# Patient Record
Sex: MALE | Race: WHITE | HISPANIC OR LATINO | Employment: OTHER | URBAN - METROPOLITAN AREA
[De-identification: names, ages, dates, MRNs, and addresses within clinical notes are randomized per-mention and may not be internally consistent; named-entity substitution may affect disease eponyms.]

---

## 2019-04-16 ENCOUNTER — HOSPITAL ENCOUNTER (EMERGENCY)
Facility: HOSPITAL | Age: 29
Discharge: HOME | End: 2019-04-17
Attending: EMERGENCY MEDICINE

## 2019-04-16 DIAGNOSIS — K21.9 GERD (GASTROESOPHAGEAL REFLUX DISEASE): Primary | ICD-10-CM

## 2019-04-16 LAB — DEPRECATED S PYO AG THROAT QL EIA: NEGATIVE

## 2019-04-16 PROCEDURE — 99283 EMERGENCY DEPT VISIT LOW MDM: CPT | Mod: 25

## 2019-04-16 PROCEDURE — 25000003 PHARM REV CODE 250: Performed by: EMERGENCY MEDICINE

## 2019-04-16 PROCEDURE — 87081 CULTURE SCREEN ONLY: CPT

## 2019-04-16 PROCEDURE — 87880 STREP A ASSAY W/OPTIC: CPT

## 2019-04-16 RX ADMIN — LIDOCAINE HYDROCHLORIDE 50 ML: 20 SOLUTION ORAL; TOPICAL at 11:04

## 2019-04-17 VITALS
WEIGHT: 139.44 LBS | HEIGHT: 62 IN | OXYGEN SATURATION: 100 % | SYSTOLIC BLOOD PRESSURE: 157 MMHG | DIASTOLIC BLOOD PRESSURE: 88 MMHG | TEMPERATURE: 99 F | RESPIRATION RATE: 18 BRPM | BODY MASS INDEX: 25.66 KG/M2 | HEART RATE: 87 BPM

## 2019-04-17 RX ORDER — SUCRALFATE 1 G/1
1 TABLET ORAL 4 TIMES DAILY
Qty: 120 TABLET | Refills: 0 | Status: SHIPPED | OUTPATIENT
Start: 2019-04-17 | End: 2019-05-17

## 2019-04-17 RX ORDER — ESOMEPRAZOLE MAGNESIUM 40 MG/1
40 CAPSULE, DELAYED RELEASE ORAL DAILY
Qty: 30 CAPSULE | Refills: 11 | Status: SHIPPED | OUTPATIENT
Start: 2019-04-17 | End: 2020-04-16

## 2019-04-17 NOTE — ED PROVIDER NOTES
SCRIBE #1 NOTE: I, Valentina Emmanuel, am scribing for, and in the presence of, Roverto Rowland Jr., MD. I have scribed the entire note.      History      Chief Complaint   Patient presents with    Sore Throat       Review of patient's allergies indicates:  No Known Allergies     HPI   HPI    4/16/2019, 10:43 PM   History obtained from the patient      History of Present Illness: Himanshu Nicholas is a 29 y.o. male patient who presents to the Emergency Department for sore throat which onset gradually PTA. Sxs are constant and mild in severity. No modifying factors. Pt is also c/o burning epigastric abd pain consistent with heart burn. Associated sxs include subjective fever, non-productive cough, diarrhea. Pt denies any CP, n/v, congestion, and all other sxs. No further complaints or concerns.       Arrival mode: Personal vehicle      PCP: No primary care provider on file.       Past Medical History:  Past medical history reviewed not relevant    Past Surgical History:  Past surgical history reviewed not relevant    Family History:  Family history reviewed not relevant    Social History:    Social History Main Topics    Smoking status: Unknown if ever smoked    Smokeless tobacco: Unknown if ever used    Alcohol Use: Unknown drinking history    Drug Use: Unknown if ever used    Sexual Activity: Unknown     ROS   Review of Systems   Constitutional: Positive for fever (subjective).   HENT: Positive for sore throat. Negative for congestion.    Respiratory: Positive for cough. Negative for shortness of breath.    Cardiovascular: Negative for chest pain.   Gastrointestinal: Positive for abdominal pain (epigastric) and diarrhea. Negative for nausea and vomiting.   Genitourinary: Negative for dysuria.   Musculoskeletal: Negative for back pain.   Skin: Negative for rash.   Neurological: Negative for weakness.   Hematological: Does not bruise/bleed easily.   All other systems reviewed and are negative.    Physical  "Exam      Initial Vitals [04/16/19 2208]   BP Pulse Resp Temp SpO2   (!) 161/81 74 20 99.2 °F (37.3 °C) 97 %      MAP       --          Physical Exam  Nursing Notes and Vital Signs Reviewed.  Constitutional: Patient is in no acute distress. Well-developed and well-nourished.  Head: Atraumatic. Normocephalic.  Eyes: PERRL. EOM intact. Conjunctivae are not pale. No scleral icterus.  Injected conjunctiva.  Atopic faces.  Inflamed turbinates.  No P is clear.  There is no pus.  ENT: Mucous membranes are moist. Oropharynx is clear and symmetric.    Neck: Supple. Full ROM. No lymphadenopathy.  Cardiovascular: Regular rate. Regular rhythm. No murmurs, rubs, or gallops. Distal pulses are 2+ and symmetric.  Pulmonary/Chest: No respiratory distress. Clear to auscultation bilaterally. No wheezing or rales.  Abdominal: Soft and non-distended.  mild epigastric tenderness without guarding or rebound.  Musculoskeletal: Moves all extremities. No obvious deformities.   Skin: Warm and dry.  Neurological:  Alert, awake, and appropriate.  Normal speech.  No acute focal neurological deficits are appreciated.  Psychiatric: Normal affect. Good eye contact. Appropriate in content.    ED Course    Procedures  ED Vital Signs:  Vitals:    04/16/19 2208   BP: (!) 161/81   Pulse: 74   Resp: 20   Temp: 99.2 °F (37.3 °C)   TempSrc: Oral   SpO2: 97%   Weight: 63.3 kg (139 lb 7.1 oz)   Height: 5' 2" (1.575 m)       Abnormal Lab Results:  Labs Reviewed   THROAT SCREEN, RAPID   CULTURE, STREP A,  THROAT        All Lab Results:  Results for orders placed or performed during the hospital encounter of 04/16/19   Rapid strep screen   Result Value Ref Range    Rapid Strep A Screen Negative Negative     Imaging Results          X-Ray Chest PA And Lateral (In process)               12:38 AM: Per ED physician, pt's CXR results show: NAF         The Emergency Provider reviewed the vital signs and test results, which are outlined above.    ED Discussion "     11:32 PM: Re-evaluated pt. Pt reports he was seen in an ER in [place] a couple of months ago for epigastric pain that radiates up into his chest. He reports associated nausea. Pt states he was prescribed a medication but had no relief. He also reports a funny taste in his mouth and SOB secondary to the pain. He reports the pain is worse when he eats.     12:45 AM: Reassessed pt at this time.  Pt states his condition has improved at this time. Discussed with pt all pertinent ED information and results. Discussed pt dx and plan of tx. Gave pt all f/u and return to the ED instructions. All questions and concerns were addressed at this time. Pt expresses understanding of information and instructions, and is comfortable with plan to discharge. Pt is stable for discharge.    12:49 AM  Patient is stable nontoxic.  Patient complain of epigastric pain radiating to his chest with water brash.  He had been treated previously in [place]  for similar symptoms but is out of medications.  He has water brash in some sore throat as well. Clinically the patient has some epigastric tenderness in this appears to be gastroesophageal reflux.  I will treat him as such for close follow-up.  I have discussed all findings with the patient and he verbalized understanding.  We have used the  service here for this procedure.(EVANGELINA)    ED Medication(s):  Medications   GI cocktail (mylanta 30 mL, lidocaine 2 % viscous 10 mL, dicyclomine 10 mL) 50 mL (50 mLs Oral Given 4/16/19 7791)     New Prescriptions    ESOMEPRAZOLE (NEXIUM) 40 MG CAPSULE    Take 1 capsule (40 mg total) by mouth once daily.    SUCRALFATE (CARAFATE) 1 GRAM TABLET    Take 1 tablet (1 g total) by mouth 4 (four) times daily.     Follow-up Information     [facility] [place] [facility] In 1 day.    Specialty:  Urgent Care  Contact information:  [address] [place] [address] [phone]                     Medical Decision Making    Medical Decision  Making:   Clinical Tests:   Lab Tests: Ordered and Reviewed  Radiological Study: Ordered and Reviewed           Scribe Attestation:   Scribe #1: I performed the above scribed service and the documentation accurately describes the services I performed. I attest to the accuracy of the note.    Attending:   Physician Attestation Statement for Scribe #1: I, Roverto Rowland Jr., MD, personally performed the services described in this documentation, as scribed by Valentina Benitez, in my presence, and it is both accurate and complete.          Clinical Impression       ICD-10-CM ICD-9-CM   1. GERD (gastroesophageal reflux disease) K21.9 530.81       Disposition:   Disposition: Discharged  Condition: Stable         Roverto Rowland Jr., MD  04/17/19 0049

## 2019-04-17 NOTE — ED NOTES
"Pt reports being diagnosed with stomach infection where he has been having a fever and heartburn x3 weeks. Reports he was prescribed medicine but can not remember the name at this time. States "sometimes the burn gets so bad that I cant breathe anymore"  "

## 2019-04-19 ENCOUNTER — HOSPITAL ENCOUNTER (EMERGENCY)
Facility: HOSPITAL | Age: 29
Discharge: HOME | End: 2019-04-19
Attending: EMERGENCY MEDICINE

## 2019-04-19 VITALS
RESPIRATION RATE: 18 BRPM | HEART RATE: 69 BPM | BODY MASS INDEX: 23.49 KG/M2 | DIASTOLIC BLOOD PRESSURE: 75 MMHG | OXYGEN SATURATION: 98 % | HEIGHT: 64 IN | WEIGHT: 137.56 LBS | SYSTOLIC BLOOD PRESSURE: 145 MMHG | TEMPERATURE: 99 F

## 2019-04-19 DIAGNOSIS — Z87.898 HISTORY OF FEVER: ICD-10-CM

## 2019-04-19 DIAGNOSIS — B96.89 ACUTE BACTERIAL BRONCHITIS: Primary | ICD-10-CM

## 2019-04-19 DIAGNOSIS — J20.8 ACUTE BACTERIAL BRONCHITIS: Primary | ICD-10-CM

## 2019-04-19 DIAGNOSIS — R05.9 COUGH: ICD-10-CM

## 2019-04-19 LAB — BACTERIA THROAT CULT: NORMAL

## 2019-04-19 PROCEDURE — 99283 EMERGENCY DEPT VISIT LOW MDM: CPT

## 2019-04-19 RX ORDER — AMOXICILLIN AND CLAVULANATE POTASSIUM 875; 125 MG/1; MG/1
1 TABLET, FILM COATED ORAL 2 TIMES DAILY
Qty: 14 TABLET | Refills: 0 | Status: SHIPPED | OUTPATIENT
Start: 2019-04-19

## 2019-04-20 NOTE — ED PROVIDER NOTES
SCRIBE #1 NOTE: I, Hannah Whitehead, am scribing for, and in the presence of, Howard Alvarado NP. I have scribed the entire note.      History      Chief Complaint   Patient presents with    Fever     fever, HA, congestion, sore throat, pain on inspiration       Review of patient's allergies indicates:  No Known Allergies     HPI   HPI    4/19/2019, 7:15 PM   History obtained from the patient      History of Present Illness: Himanshu Nicholas is a 29 y.o. male patient who presents to the Emergency Department for fever which onset gradually 4 days ago. Symptoms are constant and moderate in severity. Pt was seen in ED on 4/16/19 for sore throat, epigastric abd pain, fever, and cough. Pt had negative chest x-ray and discharged with Nexium and Carafate. Pt reports no relief of sxs. No mitigating or exacerbating factors reported. Patient denies any rash, nasal congestion, rhinorrhea, ear pain, ear discharge, CP, SOB, HA, and all other sxs at this time. No further complaints or concerns at this time.       Arrival mode: Personal vehicle     PCP: Primary Doctor No     Past Medical History:  Past medical history reviewed not relevant      Past Surgical History:  Past surgical history reviewed not relevant      Family History:  Family history reviewed not relevant      Social History:  Social History    Social History Main Topics    Social History Main Topics    Smoking status: Unknown if ever smoked    Smokeless tobacco: Unknown if ever used    Alcohol Use: Unknown drinking history    Drug Use: Unknown if ever used    Sexual Activity: Unknown         ROS   Review of Systems   Constitutional: Positive for chills and fever.   HENT: Positive for sore throat. Negative for congestion, ear discharge, ear pain and rhinorrhea.    Respiratory: Positive for cough. Negative for shortness of breath.    Cardiovascular: Negative for chest pain.   Gastrointestinal: Positive for abdominal pain. Negative for diarrhea, nausea and  "vomiting.   Genitourinary: Negative for dysuria.   Musculoskeletal: Negative for back pain and neck pain.   Skin: Negative for rash.   Neurological: Negative for dizziness, syncope, weakness, numbness and headaches.   Hematological: Does not bruise/bleed easily.   All other systems reviewed and are negative.      Physical Exam      Initial Vitals [04/19/19 1858]   BP Pulse Resp Temp SpO2   (!) 145/75 69 18 98.6 °F (37 °C) 98 %      MAP       --          Physical Exam  Nursing Notes and Vital Signs Reviewed.  Constitutional: Patient is in no acute distress. Well-developed and well-nourished.  Head: Atraumatic. Normocephalic.  Eyes: PERRL. EOM intact. Conjunctivae are not pale. No scleral icterus.  Ears: Right TM normal. Left TM normal. No erythema. No bulging. No effusion or air-fluid levels. No perforation.   Nose: Patent nares. Turbinates are normal. No drainage.   Throat: Moist mucous membranes. Posterior oropharynx is symmetric without erythema. No trismus. Normal handling of secretions. No stridor.   Neck: Supple. Full ROM. No lymphadenopathy.  Cardiovascular: Regular rate. Regular rhythm. No murmurs, rubs, or gallops. Distal pulses are 2+ and symmetric.  Pulmonary/Chest: No respiratory distress. Clear to auscultation bilaterally. No wheezing or rales.  Abdominal: Soft and non-distended.  There is no tenderness.  No rebound, guarding, or rigidity. Good bowel sounds.  Genitourinary: No CVA tenderness  Musculoskeletal: Moves all extremities. No obvious deformities. No edema. No calf tenderness.  Skin: Warm and dry.  Neurological:  Alert, awake, and appropriate.  Normal speech.  No acute focal neurological deficits are appreciated.  Psychiatric: Normal affect. Good eye contact. Appropriate in content.    ED Course    Procedures  ED Vital Signs:  Vitals:    04/19/19 1858   BP: (!) 145/75   Pulse: 69   Resp: 18   Temp: 98.6 °F (37 °C)   TempSrc: Oral   SpO2: 98%   Weight: 62.4 kg (137 lb 9.1 oz)   Height: 5' 4" " (1.626 m)              The Emergency Provider reviewed the vital signs and test results, which are outlined above.    ED Discussion     7:24 PM Pt is awake, alert, and in NAD at this time. Discussed with pt all pertinent ED information and results. Discussed pt dx and plan of tx. Gave pt all f/u and return to the ED instructions. All questions and concerns were addressed at this time. Pt expresses understanding of information and instructions, and is comfortable with plan to discharge. Pt is stable for discharge.        ED Medication(s):  Medications - No data to display    New Prescriptions    AMOXICILLIN-CLAVULANATE 875-125MG (AUGMENTIN) 875-125 MG PER TABLET    Take 1 tablet by mouth 2 (two) times daily.       Follow-up Information     [facility]    Specialty:  Emergency Medicine  Why:  If symptoms worsen  Contact information:  [address] [facility] [address] [phone]                   Medical Decision Making              Scribe Attestation:   Scribe #1: I performed the above scribed service and the documentation accurately describes the services I performed. I attest to the accuracy of the note.    Attending:   Physician Attestation Statement for Scribe #1: I, Howard Alvarado, JAYLENE, personally performed the services described in this documentation, as scribed by Hannah Whitehead, in my presence, and it is both accurate and complete.          Clinical Impression       ICD-10-CM ICD-9-CM   1. Acute bacterial bronchitis J20.8 466.0    B96.89 041.9   2. Cough R05 786.2   3. History of fever Z87.898 V13.89       Disposition:   Disposition: Discharged  Condition: Stable         Howard Alvarado Jr., FNP  04/20/19 1101